# Patient Record
Sex: FEMALE | Race: WHITE | NOT HISPANIC OR LATINO | URBAN - METROPOLITAN AREA
[De-identification: names, ages, dates, MRNs, and addresses within clinical notes are randomized per-mention and may not be internally consistent; named-entity substitution may affect disease eponyms.]

---

## 2023-01-01 ENCOUNTER — LAB OUTSIDE AN ENCOUNTER (OUTPATIENT)
Dept: URBAN - METROPOLITAN AREA CLINIC 118 | Facility: CLINIC | Age: 68
End: 2023-01-01

## 2023-01-01 ENCOUNTER — DASHBOARD ENCOUNTERS (OUTPATIENT)
Age: 68
End: 2023-01-01

## 2023-01-01 ENCOUNTER — OFFICE VISIT (OUTPATIENT)
Dept: URBAN - METROPOLITAN AREA CLINIC 52 | Facility: CLINIC | Age: 68
End: 2023-01-01

## 2023-01-01 ENCOUNTER — OFFICE VISIT (OUTPATIENT)
Dept: URBAN - METROPOLITAN AREA CLINIC 118 | Facility: CLINIC | Age: 68
End: 2023-01-01
Payer: MEDICARE

## 2023-01-01 VITALS
TEMPERATURE: 97.3 F | WEIGHT: 202.4 LBS | DIASTOLIC BLOOD PRESSURE: 73 MMHG | BODY MASS INDEX: 37.25 KG/M2 | SYSTOLIC BLOOD PRESSURE: 158 MMHG | HEART RATE: 48 BPM | HEIGHT: 62 IN

## 2023-01-01 DIAGNOSIS — K74.60 CIRRHOSIS OF LIVER WITHOUT ASCITES, UNSPECIFIED HEPATIC CIRRHOSIS TYPE: ICD-10-CM

## 2023-01-01 DIAGNOSIS — Z86.010 PERSONAL HISTORY OF COLONIC POLYPS: ICD-10-CM

## 2023-01-01 DIAGNOSIS — Z86.19 HISTORY OF HEPATITIS C: ICD-10-CM

## 2023-01-01 DIAGNOSIS — R74.8 ABNORMAL LIVER ENZYMES: ICD-10-CM

## 2023-01-01 LAB
ABSOLUTE BASOPHILS: 90
ABSOLUTE EOSINOPHILS: 192
ABSOLUTE LYMPHOCYTES: 2086
ABSOLUTE MONOCYTES: 627
ABSOLUTE NEUTROPHILS: 3405
AFP, SERUM, TUMOR MARKER: 4.9
ALBUMIN/GLOBULIN RATIO: 1
ALBUMIN: 3.6
ALKALINE PHOSPHATASE: 114
ALT (SGPT): 19
AST (SGOT): 36
BASOPHILS: 1.4
BILIRUBIN, DIRECT: 0.2
BILIRUBIN, INDIRECT: 0.7
BILIRUBIN, TOTAL: 0.9
BUN/CREATININE RATIO: 19
CALCIUM: 9.3
CARBON DIOXIDE: 27
CHLORIDE: 107
CREATININE: 1.23
EGFR: 48
EOSINOPHILS: 3
GLOBULIN: 3.6
GLUCOSE: 95
HCV RNA, QUANTITATIVE: <1.18
HCV RNA, QUANTITATIVE: <15
HEMATOCRIT: 41
HEMOGLOBIN: 14.4
HEPATITIS B CORE AB TOTAL: REACTIVE
HEPATITIS B SURFACE ANTIGEN: (no result)
INR: 1.1
LYMPHOCYTES: 32.6
MCH: 31.6
MCHC: 35.1
MCV: 90.1
MONOCYTES: 9.8
MPV: 11
NEUTROPHILS: 53.2
PLATELET COUNT: 218
POTASSIUM: 3.7
PROTEIN, TOTAL: 7.2
PT: 11.6
RDW: 13.9
RED BLOOD CELL COUNT: 4.55
SODIUM: 144
UREA NITROGEN (BUN): 23
WHITE BLOOD CELL COUNT: 6.4

## 2023-01-01 PROCEDURE — 99204 OFFICE O/P NEW MOD 45 MIN: CPT | Performed by: INTERNAL MEDICINE

## 2023-01-01 RX ORDER — OXYBUTYNIN CHLORIDE 5 MG/1
TABLET ORAL
Qty: 180 TABLET | Status: ACTIVE | COMMUNITY

## 2023-01-01 RX ORDER — AMLODIPINE BESYLATE 5 MG/1
TABLET ORAL
Qty: 90 TABLET | Status: ACTIVE | COMMUNITY

## 2023-01-01 RX ORDER — PROMETHAZINE HYDROCHLORIDE 25 MG/1
TABLET ORAL
Qty: 60 TABLET | Status: ACTIVE | COMMUNITY

## 2023-01-01 RX ORDER — IBUPROFEN 600 MG/1
TABLET, FILM COATED ORAL
Qty: 90 TABLET | Status: ACTIVE | COMMUNITY

## 2023-01-01 RX ORDER — METOPROLOL TARTRATE 25 MG/1
TABLET, FILM COATED ORAL
Qty: 90 TABLET | Status: ACTIVE | COMMUNITY

## 2023-01-01 RX ORDER — GABAPENTIN 300 MG/1
CAPSULE ORAL
Qty: 90 CAPSULE | Status: ACTIVE | COMMUNITY

## 2023-01-01 RX ORDER — DULOXETINE 60 MG/1
TAKE 1 CAPSULE BY MOUTH EVERY DAY CAPSULE, DELAYED RELEASE ORAL
Qty: 90 EACH | Refills: 0 | Status: ACTIVE | COMMUNITY

## 2023-12-06 PROBLEM — 428283002: Status: ACTIVE | Noted: 2023-01-01

## 2023-12-06 PROBLEM — 19943007: Status: ACTIVE | Noted: 2023-01-01

## 2023-12-06 NOTE — HPI-TODAY'S VISIT:
The patient self-referred for a surveillance colonoscopy as well as surveillance of liver disease.  Note was sent to her primary care.  She is a 68-year-old female whose last colonoscopy was in 2016.  She had no polyps at that time but does have a history of adenomatous polyps in the past.  There is no family history of colon cancer or colon polyps.  She has had no recent rectal bleeding or acute change in her bowel habits.  She does have chronic liver disease with a history of Lorie a cirrhosis.  Presumably this was due to her history of hepatitis C, which was cured several years ago with medications.  She has not had a recent viral load or alpha-fetoprotein.  She has no history of bleeding varices or ascites requiring diuretics.  There is no family history of liver cancer.

## 2024-02-09 ENCOUNTER — COL/EGD (OUTPATIENT)
Dept: URBAN - METROPOLITAN AREA SURGERY CENTER 23 | Facility: SURGERY CENTER | Age: 69
End: 2024-02-09